# Patient Record
Sex: FEMALE | Race: WHITE | Employment: OTHER | URBAN - METROPOLITAN AREA
[De-identification: names, ages, dates, MRNs, and addresses within clinical notes are randomized per-mention and may not be internally consistent; named-entity substitution may affect disease eponyms.]

---

## 2017-01-27 ENCOUNTER — LAB ONLY (OUTPATIENT)
Dept: INTERNAL MEDICINE CLINIC | Age: 72
End: 2017-01-27

## 2017-01-27 ENCOUNTER — HOSPITAL ENCOUNTER (OUTPATIENT)
Dept: LAB | Age: 72
Discharge: HOME | End: 2017-01-27
Payer: COMMERCIAL

## 2017-01-27 DIAGNOSIS — R82.90 ABNORMAL URINE ODOR: ICD-10-CM

## 2017-01-27 DIAGNOSIS — R82.90 ABNORMAL URINE ODOR: Primary | ICD-10-CM

## 2017-01-27 LAB
APPEARANCE UR: ABNORMAL
BACTERIA URNS QL MICRO: ABNORMAL /HPF
BILIRUB UR QL: NEGATIVE
CHOLEST SERPL-MCNC: 323 MG/DL
COLOR UR: YELLOW
EPITH CASTS URNS QL MICRO: ABNORMAL /LPF (ref 0–5)
GLUCOSE UR STRIP.AUTO-MCNC: NEGATIVE MG/DL
HDLC SERPL-MCNC: 53 MG/DL (ref 40–60)
HDLC SERPL: 6.1 {RATIO} (ref 0–5)
HGB UR QL STRIP: NEGATIVE
KETONES UR QL STRIP.AUTO: NEGATIVE MG/DL
LDLC SERPL CALC-MCNC: 209.8 MG/DL (ref 0–100)
LEUKOCYTE ESTERASE UR QL STRIP.AUTO: ABNORMAL
LIPID PROFILE,FLP: ABNORMAL
NITRITE UR QL STRIP.AUTO: NEGATIVE
PH UR STRIP: 6.5 [PH] (ref 5–8)
PROT UR STRIP-MCNC: NEGATIVE MG/DL
RBC #/AREA URNS HPF: ABNORMAL /HPF (ref 0–5)
SP GR UR REFRACTOMETRY: 1.02 (ref 1–1.03)
TRIGL SERPL-MCNC: 301 MG/DL (ref ?–150)
UROBILINOGEN UR QL STRIP.AUTO: 0.2 EU/DL (ref 0.2–1)
VLDLC SERPL CALC-MCNC: 60.2 MG/DL
WBC URNS QL MICRO: ABNORMAL /HPF (ref 0–4)

## 2017-01-27 PROCEDURE — 80061 LIPID PANEL: CPT | Performed by: INTERNAL MEDICINE

## 2017-01-27 PROCEDURE — 82306 VITAMIN D 25 HYDROXY: CPT | Performed by: INTERNAL MEDICINE

## 2017-01-27 PROCEDURE — 81001 URINALYSIS AUTO W/SCOPE: CPT | Performed by: INTERNAL MEDICINE

## 2017-01-27 PROCEDURE — 36415 COLL VENOUS BLD VENIPUNCTURE: CPT | Performed by: INTERNAL MEDICINE

## 2017-01-28 LAB — 25(OH)D3 SERPL-MCNC: 16.4 NG/ML (ref 30–100)

## 2017-02-03 ENCOUNTER — OFFICE VISIT (OUTPATIENT)
Dept: INTERNAL MEDICINE CLINIC | Age: 72
End: 2017-02-03

## 2017-02-03 VITALS
TEMPERATURE: 97.5 F | WEIGHT: 209 LBS | OXYGEN SATURATION: 95 % | BODY MASS INDEX: 34.82 KG/M2 | HEART RATE: 94 BPM | SYSTOLIC BLOOD PRESSURE: 122 MMHG | DIASTOLIC BLOOD PRESSURE: 78 MMHG | RESPIRATION RATE: 16 BRPM | HEIGHT: 65 IN

## 2017-02-03 DIAGNOSIS — Z23 ENCOUNTER FOR IMMUNIZATION: ICD-10-CM

## 2017-02-03 DIAGNOSIS — F41.9 ANXIETY: ICD-10-CM

## 2017-02-03 DIAGNOSIS — E03.4 HYPOTHYROIDISM DUE TO ACQUIRED ATROPHY OF THYROID: ICD-10-CM

## 2017-02-03 DIAGNOSIS — E55.9 HYPOVITAMINOSIS D: ICD-10-CM

## 2017-02-03 DIAGNOSIS — I10 ESSENTIAL HYPERTENSION: Primary | ICD-10-CM

## 2017-02-03 NOTE — MR AVS SNAPSHOT
Visit Information Date & Time Provider Department Dept. Phone Encounter #  
 2/3/2017 11:00 AM Reji Johnson MD Internist of [facility] [id number] Your Appointments 6/1/2017  9:55 AM  
LAB with [facility] NURSE VISIT Internist of [facility] Appt Note: labs for rpe rm  
 [address] [facility] - [place] [id number] [facility] [address]  
  
   
 [address] [facility]  
  
    
 6/8/2017  9:15 AM  
PHYSICAL with Reji Johnson MD  
Internist of [facility]) Appt Note: rpe rm  
 [address] [facility] - [address]  
  
   
 [address] [facility] Upcoming Health Maintenance Date Due DTaP/Tdap/Td series (1 - Tdap) 10/4/1968 MEDICARE YEARLY EXAM 10/4/2012 Pneumococcal 65+ Low/Medium Risk (2 of 2 - PCV13) 8/28/2014 INFLUENZA AGE 9 TO ADULT 8/1/2016 BREAST CANCER SCRN MAMMOGRAM 12/11/2016 GLAUCOMA SCREENING Q2Y 4/20/2017 COLONOSCOPY 1/20/2026 Allergies as of 2/3/2017  Review Complete On: 2/3/2017 By: Reji Johnson MD  
  
 Severity Noted Reaction Type Reactions Codeine  01/27/2012    Other (comments) Drops her blood pressure Iodine  02/28/2012    Hives Percodan [Oxycodone Hcl-oxycodone-asa]  01/27/2012    Other (comments) Feels strange Sulfa (Sulfonamide Antibiotics)  01/27/2012    Rash Vicodin [Hydrocodone-acetaminophen]  01/27/2012    Other (comments) Feels strange Current Immunizations  Reviewed on 1/20/2016 Name Date Influenza High Dose Vaccine PF 2/3/2017 12:03 PM  
 Influenza Vaccine (Quad) PF 1/20/2016  2:39 PM  
 Influenza Vaccine PF 10/21/2014, 10/28/2013 Pneumococcal Polysaccharide (PPSV-23) 8/28/2013 10:17 AM  
 Zoster Vaccine, Live 10/28/2013 Not reviewed this visit You Were Diagnosed With   
  
 Codes Comments Encounter for immunization    -  Primary ICD-10-CM: T41 ICD-9-CM: V03.89 Vitals BP Pulse Temp Resp Height(growth percentile) Weight(growth percentile) 122/78 (BP 1 Location: Left arm, BP Patient Position: Sitting) 94 97.5 °F (36.4 °C) (Oral) 16 5' 5\" (1.651 m) 209 lb (94.8 kg) SpO2 BMI OB Status Smoking Status 95% 34.78 kg/m2 Postmenopausal Never Smoker Vitals History BMI and BSA Data Body Mass Index Body Surface Area 34.78 kg/m 2 2.09 m 2 Preferred Pharmacy Pharmacy Name Phone [address] [facility], [address] [place] [address] [phone] Your Updated Medication List  
  
   
This list is accurate as of: 2/3/17 12:16 PM.  Always use your most recent med list.  
  
  
  
  
 levothyroxine 112 mcg tablet Commonly known as:  SYNTHROID  
TAKE 1 TABLET BY MOUTH EVERY MORNING BEFORE BREAKFAST  
  
 triamterene-hydroCHLOROthiazide 37.5-25 mg per tablet Commonly known as:  Anaya Perch TAKE 1 TABLET BY MOUTH EVERY DAY We Performed the Following INFLUENZA VIRUS VACCINE, HIGH DOSE SEASONAL, PRESERVATIVE FREE [57386 CPT(R)] Introducing [facility] & HEALTH SERVICES! Dear Dima Duong: 
Thank you for requesting a [employer] account. Our records indicate that you already have an active InboxFever account. You can access your account anytime at [email or web address] Did you know that you can access your hospital and ER discharge instructions at any time in Inbox[employer]? You can also review all of your test results from your hospital stay or ER visit. Additional Information If you have questions, please visit the Frequently Asked Questions section of the InboxFever website at [email or web address] Remember, InboxFever is NOT to be used for urgent needs. For medical emergencies, dial 911. Now available from your iPhone and Android! Please provide this summary of care documentation to your next provider. Your primary care clinician is listed as Isaac Cazares. Safia Ruffin. If you have any questions after today's visit, please call [phone].

## 2017-02-03 NOTE — PROGRESS NOTES
Dmitry Berg 1945, is a 73 y.o. female, who is seen today for reevaluation of hyperlipidemia hypovitaminosis D thyroidism overweight. She had been doing reasonably well but anxious a lot of the time. She is now been called for jury duty and with her anxiety she sees no way she could participate with jury duty. She takes her medicine regularly except not taking vitamin D regularly.     Past Medical History   Diagnosis Date    Allergic rhinitis     GERD (gastroesophageal reflux disease)     Hypercholesterolemia     Thyroid disease      Past Surgical History   Procedure Laterality Date    Xr endo egd w dilatation  2/2/12     intrinsic stricture, esophageal junction    Hx heent       tonsillectomy     Current Outpatient Prescriptions   Medication Sig Dispense Refill    levothyroxine (SYNTHROID) 112 mcg tablet TAKE 1 TABLET BY MOUTH EVERY MORNING BEFORE BREAKFAST 90 Tab 0    triamterene-hydrochlorothiazide (MAXZIDE) 37.5-25 mg per tablet TAKE 1 TABLET BY MOUTH EVERY DAY 90 Tab 0     Allergies   Allergen Reactions    Codeine Other (comments)     Drops her blood pressure    Iodine Hives    Percodan [Oxycodone Hcl-Oxycodone-Asa] Other (comments)     Feels strange    Sulfa (Sulfonamide Antibiotics) Rash    Vicodin [Hydrocodone-Acetaminophen] Other (comments)     Feels strange       Social History     Social History    Marital status:      Spouse name: N/A    Number of children: N/A    Years of education: N/A     Social History Main Topics    Smoking status: Never Smoker    Smokeless tobacco: Never Used    Alcohol use No    Drug use: No    Sexual activity: Not Asked     Other Topics Concern    None     Social History Narrative     Visit Vitals    /78 (BP 1 Location: Left arm, BP Patient Position: Sitting)    Pulse 94    Temp 97.5 °F (36.4 °C) (Oral)    Resp 16    Ht 5' 5\" (1.651 m)    Wt 209 lb (94.8 kg)    SpO2 95%    BMI 34.78 kg/m2     The patient is a well-developed well-nourished female in no apparent distress. HEENT: Pupils are equal and react to light and extraocular movements are full. Ear canals and tympanic membranes appear normal. Oral cavity appears normal with no oral lesions. Neck: Carotids are 2+ without bruits. No adenopathy or thyromegaly. Lungs are clear to percussion. I hear no wheezing, rales or rhonchi. Heart reveals a regular rhythm with no murmur, gallop, click or rub. The apical impulse is in the fifth interspace at the midclavicular line. Abdomen is soft and nontender with no hepatosplenomegaly or masses. Bowel sounds are normoactive and there is no distention or tympany. Extremities reveal no clubbing cyanosis or edema. Pulses are 2+. Skin reveals no suspicious skin growths. Breasts reveal no masses, skin or nipple abnormalities. No axillary adenopathy. Results for orders placed or performed during the hospital encounter of 01/27/17   URINALYSIS W/ RFLX MICROSCOPIC   Result Value Ref Range    Color YELLOW      Appearance CLOUDY      Specific gravity 1.019 1.005 - 1.030      pH (UA) 6.5 5.0 - 8.0      Protein NEGATIVE  NEG mg/dL    Glucose NEGATIVE  NEG mg/dL    Ketone NEGATIVE  NEG mg/dL    Bilirubin NEGATIVE  NEG      Blood NEGATIVE  NEG      Urobilinogen 0.2 0.2 - 1.0 EU/dL    Nitrites NEGATIVE  NEG      Leukocyte Esterase LARGE (A) NEG     LIPID PANEL   Result Value Ref Range    LIPID PROFILE          Cholesterol, total 323 (H) <200 MG/DL    Triglyceride 301 (H) <150 MG/DL    HDL Cholesterol 53 40 - 60 MG/DL    LDL, calculated 209.8 (H) 0 - 100 MG/DL    VLDL, calculated 60.2 MG/DL    CHOL/HDL Ratio 6.1 (H) 0 - 5.0     VITAMIN D, 25 HYDROXY   Result Value Ref Range    Vitamin D 25-Hydroxy 16.4 (L) 30 - 100 ng/mL   URINE MICROSCOPIC ONLY   Result Value Ref Range    WBC 21 to 35 0 - 4 /hpf    RBC 0 to 3 0 - 5 /hpf    Epithelial cells 3+ 0 - 5 /lpf    Bacteria 3+ (A) NEG /hpf     Assessment: #1.   Patient remains quite anxious and is not a very good candidate for jury duty at this time. #2. Hypovitaminosis D, she does need to increase her vitamin D to at least 2000 units daily. #3. Hypothyroidism doing well, she will continue levothyroxine 112 µg daily. #4.  Hypertension doing well, she will continue Maxide 25 1 daily. Follow-up in 3 months for physical    Demetris Dwyer MD FACP    Please note: This document has been produced using voice recognition software. Unrecognized errors in transcription may be present.

## 2017-02-03 NOTE — LETTER
2/3/2017 6:31 PM 
 
Ms. Divine Baum Jarocho Dhillonlc[phone] [facility] [address] To Whom It May Concern: 
 
Divine Baum has been a long-term patient of mine at [facility]. She suffers from significant anxiety and at this point would be a poor candidate for jury duty. A final decision is up to the court. Sincerely, Estefani Hyde MD, FACP

## 2017-02-03 NOTE — PROGRESS NOTES
Patient is in the office today for a  follow up. Do you have an Advance Directive no  Do you want more information, will give information. 1. Have you been to the ER, urgent care clinic since your last visit? Hospitalized since your last visit? No    2. Have you seen or consulted any other health care providers outside of the [address] [place] [address] since your last visit? Include any pap smears or colon screening.  No

## 2017-02-04 ENCOUNTER — PATIENT MESSAGE (OUTPATIENT)
Dept: INTERNAL MEDICINE CLINIC | Age: 72
End: 2017-02-04

## 2017-02-27 RX ORDER — TRIAMTERENE/HYDROCHLOROTHIAZID 37.5-25 MG
TABLET ORAL
Qty: 90 TAB | Refills: 0 | Status: SHIPPED | OUTPATIENT
Start: 2017-02-27 | End: 2017-03-02 | Stop reason: SDUPTHER

## 2017-03-02 RX ORDER — TRIAMTERENE/HYDROCHLOROTHIAZID 37.5-25 MG
TABLET ORAL
Qty: 90 TAB | Refills: 0 | Status: SHIPPED | OUTPATIENT
Start: 2017-03-02 | End: 2017-05-26 | Stop reason: SDUPTHER

## 2017-03-16 RX ORDER — LEVOTHYROXINE SODIUM 112 UG/1
TABLET ORAL
Qty: 90 TAB | Refills: 0 | Status: SHIPPED | OUTPATIENT
Start: 2017-03-16

## 2017-05-30 RX ORDER — TRIAMTERENE/HYDROCHLOROTHIAZID 37.5-25 MG
TABLET ORAL
Qty: 90 TAB | Refills: 0 | Status: SHIPPED | OUTPATIENT
Start: 2017-05-30 | End: 2017-08-24 | Stop reason: SDUPTHER

## 2017-06-01 ENCOUNTER — HOSPITAL ENCOUNTER (OUTPATIENT)
Dept: LAB | Age: 72
Discharge: HOME | End: 2017-06-01
Payer: COMMERCIAL

## 2017-06-01 DIAGNOSIS — E55.9 HYPOVITAMINOSIS D: ICD-10-CM

## 2017-06-01 DIAGNOSIS — E03.4 HYPOTHYROIDISM DUE TO ACQUIRED ATROPHY OF THYROID: ICD-10-CM

## 2017-06-01 DIAGNOSIS — I10 ESSENTIAL HYPERTENSION: ICD-10-CM

## 2017-06-01 LAB
ALBUMIN SERPL BCP-MCNC: 3.7 G/DL (ref 3.4–5)
ALBUMIN/GLOB SERPL: 1.1 {RATIO} (ref 0.8–1.7)
ALP SERPL-CCNC: 71 U/L (ref 45–117)
ALT SERPL-CCNC: 26 U/L (ref 13–56)
ANION GAP BLD CALC-SCNC: 12 MMOL/L (ref 3–18)
APPEARANCE UR: CLEAR
AST SERPL W P-5'-P-CCNC: 22 U/L (ref 15–37)
BACTERIA URNS QL MICRO: ABNORMAL /HPF
BASOPHILS # BLD AUTO: 0 K/UL (ref 0–0.06)
BASOPHILS # BLD: 1 % (ref 0–2)
BILIRUB SERPL-MCNC: 0.7 MG/DL (ref 0.2–1)
BILIRUB UR QL: NEGATIVE
BUN SERPL-MCNC: 17 MG/DL (ref 7–18)
BUN/CREAT SERPL: 15 (ref 12–20)
CALCIUM SERPL-MCNC: 9.8 MG/DL (ref 8.5–10.1)
CHLORIDE SERPL-SCNC: 100 MMOL/L (ref 100–108)
CHOLEST SERPL-MCNC: 309 MG/DL
CO2 SERPL-SCNC: 28 MMOL/L (ref 21–32)
COLOR UR: YELLOW
CREAT SERPL-MCNC: 1.14 MG/DL (ref 0.6–1.3)
DIFFERENTIAL METHOD BLD: NORMAL
EOSINOPHIL # BLD: 0.2 K/UL (ref 0–0.4)
EOSINOPHIL NFR BLD: 3 % (ref 0–5)
EPITH CASTS URNS QL MICRO: ABNORMAL /LPF (ref 0–5)
ERYTHROCYTE [DISTWIDTH] IN BLOOD BY AUTOMATED COUNT: 14.5 % (ref 11.6–14.5)
GLOBULIN SER CALC-MCNC: 3.5 G/DL (ref 2–4)
GLUCOSE SERPL-MCNC: 95 MG/DL (ref 74–99)
GLUCOSE UR STRIP.AUTO-MCNC: NEGATIVE MG/DL
HCT VFR BLD AUTO: 44.4 % (ref 35–45)
HDLC SERPL-MCNC: 51 MG/DL (ref 40–60)
HDLC SERPL: 6.1 {RATIO} (ref 0–5)
HGB BLD-MCNC: 14.6 G/DL (ref 12–16)
HGB UR QL STRIP: NEGATIVE
KETONES UR QL STRIP.AUTO: NEGATIVE MG/DL
LDLC SERPL CALC-MCNC: 203.2 MG/DL (ref 0–100)
LEUKOCYTE ESTERASE UR QL STRIP.AUTO: ABNORMAL
LIPID PROFILE,FLP: ABNORMAL
LYMPHOCYTES # BLD AUTO: 26 % (ref 21–52)
LYMPHOCYTES # BLD: 1.8 K/UL (ref 0.9–3.6)
MCH RBC QN AUTO: 29.6 PG (ref 24–34)
MCHC RBC AUTO-ENTMCNC: 32.9 G/DL (ref 31–37)
MCV RBC AUTO: 89.9 FL (ref 74–97)
MONOCYTES # BLD: 0.3 K/UL (ref 0.05–1.2)
MONOCYTES NFR BLD AUTO: 4 % (ref 3–10)
NEUTS SEG # BLD: 4.6 K/UL (ref 1.8–8)
NEUTS SEG NFR BLD AUTO: 66 % (ref 40–73)
NITRITE UR QL STRIP.AUTO: NEGATIVE
PH UR STRIP: 7.5 [PH] (ref 5–8)
PLATELET # BLD AUTO: 291 K/UL (ref 135–420)
PMV BLD AUTO: 11.2 FL (ref 9.2–11.8)
POTASSIUM SERPL-SCNC: 3.5 MMOL/L (ref 3.5–5.5)
PROT SERPL-MCNC: 7.2 G/DL (ref 6.4–8.2)
PROT UR STRIP-MCNC: NEGATIVE MG/DL
RBC # BLD AUTO: 4.94 M/UL (ref 4.2–5.3)
RBC #/AREA URNS HPF: 0 /HPF (ref 0–5)
SODIUM SERPL-SCNC: 140 MMOL/L (ref 136–145)
SP GR UR REFRACTOMETRY: 1.01 (ref 1–1.03)
TRIGL SERPL-MCNC: 274 MG/DL (ref ?–150)
TSH SERPL DL<=0.05 MIU/L-ACNC: 2.11 UIU/ML (ref 0.36–3.74)
UROBILINOGEN UR QL STRIP.AUTO: 1 EU/DL (ref 0.2–1)
VLDLC SERPL CALC-MCNC: 54.8 MG/DL
WBC # BLD AUTO: 6.9 K/UL (ref 4.6–13.2)
WBC URNS QL MICRO: ABNORMAL /HPF (ref 0–4)

## 2017-06-01 PROCEDURE — 36415 COLL VENOUS BLD VENIPUNCTURE: CPT | Performed by: INTERNAL MEDICINE

## 2017-06-01 PROCEDURE — 82306 VITAMIN D 25 HYDROXY: CPT | Performed by: INTERNAL MEDICINE

## 2017-06-01 PROCEDURE — 80053 COMPREHEN METABOLIC PANEL: CPT | Performed by: INTERNAL MEDICINE

## 2017-06-01 PROCEDURE — 85025 COMPLETE CBC W/AUTO DIFF WBC: CPT | Performed by: INTERNAL MEDICINE

## 2017-06-01 PROCEDURE — 81001 URINALYSIS AUTO W/SCOPE: CPT | Performed by: INTERNAL MEDICINE

## 2017-06-01 PROCEDURE — 80061 LIPID PANEL: CPT | Performed by: INTERNAL MEDICINE

## 2017-06-01 PROCEDURE — 84443 ASSAY THYROID STIM HORMONE: CPT | Performed by: INTERNAL MEDICINE

## 2017-06-02 LAB — 25(OH)D3 SERPL-MCNC: 20.9 NG/ML (ref 30–100)

## 2017-06-08 ENCOUNTER — OFFICE VISIT (OUTPATIENT)
Dept: INTERNAL MEDICINE CLINIC | Age: 72
End: 2017-06-08

## 2017-06-08 ENCOUNTER — HOSPITAL ENCOUNTER (OUTPATIENT)
Dept: LAB | Age: 72
Discharge: HOME | End: 2017-06-08
Payer: COMMERCIAL

## 2017-06-08 VITALS
WEIGHT: 210 LBS | HEART RATE: 89 BPM | SYSTOLIC BLOOD PRESSURE: 114 MMHG | BODY MASS INDEX: 34.99 KG/M2 | DIASTOLIC BLOOD PRESSURE: 78 MMHG | HEIGHT: 65 IN | OXYGEN SATURATION: 96 % | RESPIRATION RATE: 14 BRPM | TEMPERATURE: 97.6 F

## 2017-06-08 DIAGNOSIS — E03.4 HYPOTHYROIDISM DUE TO ACQUIRED ATROPHY OF THYROID: ICD-10-CM

## 2017-06-08 DIAGNOSIS — Z00.00 ROUTINE GENERAL MEDICAL EXAMINATION AT A HEALTH CARE FACILITY: Primary | ICD-10-CM

## 2017-06-08 DIAGNOSIS — R30.0 DYSURIA: ICD-10-CM

## 2017-06-08 DIAGNOSIS — Z12.31 SCREENING MAMMOGRAM, ENCOUNTER FOR: ICD-10-CM

## 2017-06-08 DIAGNOSIS — Z13.820 OSTEOPOROSIS SCREENING: ICD-10-CM

## 2017-06-08 DIAGNOSIS — E55.9 HYPOVITAMINOSIS D: ICD-10-CM

## 2017-06-08 DIAGNOSIS — E78.5 HYPERLIPIDEMIA LDL GOAL <160: ICD-10-CM

## 2017-06-08 DIAGNOSIS — Z23 ENCOUNTER FOR IMMUNIZATION: ICD-10-CM

## 2017-06-08 DIAGNOSIS — I10 ESSENTIAL HYPERTENSION: ICD-10-CM

## 2017-06-08 PROCEDURE — 87086 URINE CULTURE/COLONY COUNT: CPT | Performed by: INTERNAL MEDICINE

## 2017-06-08 RX ORDER — ATORVASTATIN CALCIUM 40 MG/1
TABLET, FILM COATED ORAL
Qty: 90 TAB | Refills: 3 | Status: ON HOLD | OUTPATIENT
Start: 2017-06-08 | End: 2017-11-08

## 2017-06-08 RX ORDER — CHOLECALCIFEROL (VITAMIN D3) 125 MCG
CAPSULE ORAL
Qty: 100 TAB
Start: 2017-06-08

## 2017-06-08 NOTE — PROGRESS NOTES
Lidia Severino 1945, is a 73 y.o. female, who is seen today for a routine physical exam and follow-up on hyperlipidemia obesity hypertension hypothyroidism. Many years ago she was on statin therapy for marked hyperlipidemia but it was quite expensive at that time, apparently she was on Zocor. After coming off the medicine her cholesterol did go up considerably and she continues to try to follow a low-fat low-cholesterol diet with cholesterol has remained quite high. She had no side effects with simvastatin. She has hypertension which has been well controlled and she uses Maxide 25 regularly. She is obese and says she gained 50 pounds in 1991 when she was diagnosed with hypothyroidism and despite treatment has not been able to lose any weight since then. Weight is currently unchanged from last year and not significantly changed for many years. She has noticed for at least several months a slight pressure in the suprapubic area relieved briefly when she urinates and then it recurs. She has had no dysuria but does have a strong odor to her urine last few months. She has nocturia ×1 and urinary frequency in the daytime but does drink a lot of fluids.     Past Medical History:   Diagnosis Date    Allergic rhinitis     GERD (gastroesophageal reflux disease)     Hypercholesterolemia     Thyroid disease      Past Surgical History:   Procedure Laterality Date    HX HEENT      tonsillectomy    XR ENDO EGD W DILATATION  2/2/12    intrinsic stricture, esophageal junction     Current Outpatient Prescriptions   Medication Sig Dispense Refill    triamterene-hydroCHLOROthiazide (MAXZIDE) 37.5-25 mg per tablet TAKE 1 TABLET BY MOUTH EVERY DAY 90 Tab 0    levothyroxine (SYNTHROID) 112 mcg tablet TAKE 1 TABLET BY MOUTH EVERY DAY BEFORE BREAKFAST 90 Tab 0     Allergies   Allergen Reactions    Codeine Other (comments)     Drops her blood pressure    Iodine Hives    Percodan [Oxycodone Hcl-Oxycodone-Asa] Other (comments)     Feels strange    Sulfa (Sulfonamide Antibiotics) Rash    Vicodin [Hydrocodone-Acetaminophen] Other (comments)     Feels strange       Social History     Social History    Marital status:      Spouse name: N/A    Number of children: N/A    Years of education: N/A     Social History Main Topics    Smoking status: Never Smoker    Smokeless tobacco: Never Used    Alcohol use No    Drug use: No    Sexual activity: Not Asked     Other Topics Concern    None     Social History Narrative     Visit Vitals    /78    Pulse 89    Temp 97.6 °F (36.4 °C) (Oral)    Resp 14    Ht 5' 5\" (1.651 m)    Wt 210 lb (95.3 kg)    SpO2 96%    BMI 34.95 kg/m2     The patient is a well-developed well-nourished female in no apparent distress. HEENT: Pupils are equal and react to light and extraocular movements are full. Ear canal on the right is occluded with cerumen. On the left there is almost no wax. Oral cavity appears normal with no oral lesions. Neck: Carotids are 2+ without bruits. No adenopathy or thyromegaly. Lungs are clear to percussion. I hear no wheezing, rales or rhonchi. Heart reveals a regular rhythm with no murmur, gallop, click or rub. The apical impulse is in the fifth interspace at the midclavicular line. Abdomen is soft and nontender with no hepatosplenomegaly or masses. Bowel sounds are normoactive and there is no distention or tympany. Extremities reveal no clubbing cyanosis or edema. Pulses are 2+. Skin reveals no suspicious skin growths. Breasts reveal no masses, skin or nipple abnormalities. No axillary adenopathy.     Results for orders placed or performed during the hospital encounter of 06/01/17   CBC WITH AUTOMATED DIFF   Result Value Ref Range    WBC 6.9 4.6 - 13.2 K/uL    RBC 4.94 4.20 - 5.30 M/uL    HGB 14.6 12.0 - 16.0 g/dL    HCT 44.4 35.0 - 45.0 %    MCV 89.9 74.0 - 97.0 FL    MCH 29.6 24.0 - 34.0 PG    MCHC 32.9 31.0 - 37.0 g/dL    RDW 14.5 11.6 - 14.5 % PLATELET 014 473 - 368 K/uL    MPV 11.2 9.2 - 11.8 FL    NEUTROPHILS 66 40 - 73 %    LYMPHOCYTES 26 21 - 52 %    MONOCYTES 4 3 - 10 %    EOSINOPHILS 3 0 - 5 %    BASOPHILS 1 0 - 2 %    ABS. NEUTROPHILS 4.6 1.8 - 8.0 K/UL    ABS. LYMPHOCYTES 1.8 0.9 - 3.6 K/UL    ABS. MONOCYTES 0.3 0.05 - 1.2 K/UL    ABS. EOSINOPHILS 0.2 0.0 - 0.4 K/UL    ABS. BASOPHILS 0.0 0.0 - 0.06 K/UL    DF AUTOMATED     LIPID PANEL   Result Value Ref Range    LIPID PROFILE          Cholesterol, total 309 (H) <200 MG/DL    Triglyceride 274 (H) <150 MG/DL    HDL Cholesterol 51 40 - 60 MG/DL    LDL, calculated 203.2 (H) 0 - 100 MG/DL    VLDL, calculated 54.8 MG/DL    CHOL/HDL Ratio 6.1 (H) 0 - 5.0     METABOLIC PANEL, COMPREHENSIVE   Result Value Ref Range    Sodium 140 136 - 145 mmol/L    Potassium 3.5 3.5 - 5.5 mmol/L    Chloride 100 100 - 108 mmol/L    CO2 28 21 - 32 mmol/L    Anion gap 12 3.0 - 18 mmol/L    Glucose 95 74 - 99 mg/dL    BUN 17 7.0 - 18 MG/DL    Creatinine 1.14 0.6 - 1.3 MG/DL    BUN/Creatinine ratio 15 12 - 20      GFR est AA 57 (L) >60 ml/min/1.73m2    GFR est non-AA 47 (L) >60 ml/min/1.73m2    Calcium 9.8 8.5 - 10.1 MG/DL    Bilirubin, total 0.7 0.2 - 1.0 MG/DL    ALT (SGPT) 26 13 - 56 U/L    AST (SGOT) 22 15 - 37 U/L    Alk.  phosphatase 71 45 - 117 U/L    Protein, total 7.2 6.4 - 8.2 g/dL    Albumin 3.7 3.4 - 5.0 g/dL    Globulin 3.5 2.0 - 4.0 g/dL    A-G Ratio 1.1 0.8 - 1.7     TSH 3RD GENERATION   Result Value Ref Range    TSH 2.11 0.36 - 3.74 uIU/mL   URINALYSIS W/ RFLX MICROSCOPIC   Result Value Ref Range    Color YELLOW      Appearance CLEAR      Specific gravity 1.011 1.005 - 1.030      pH (UA) 7.5 5.0 - 8.0      Protein NEGATIVE  NEG mg/dL    Glucose NEGATIVE  NEG mg/dL    Ketone NEGATIVE  NEG mg/dL    Bilirubin NEGATIVE  NEG      Blood NEGATIVE  NEG      Urobilinogen 1.0 0.2 - 1.0 EU/dL    Nitrites NEGATIVE  NEG      Leukocyte Esterase MODERATE (A) NEG     VITAMIN D, 25 HYDROXY   Result Value Ref Range    Vitamin D 25-Hydroxy 20.9 (L) 30 - 100 ng/mL   URINE MICROSCOPIC ONLY   Result Value Ref Range    WBC 8 to 10 0 - 4 /hpf    RBC 0 0 - 5 /hpf    Epithelial cells 1+ 0 - 5 /lpf    Bacteria 1+ (A) NEG /hpf     Assessment: #1. Hyperlipidemia with risk of developing coronary disease over the next 10 years approximately 10.7%. She will continue working to try to keep her weight down with fairly low-fat diet and cut back on starches. She also agrees to start atorvastatin. She will be started at 40 mg each evening and if she develops significant side effects including significant myalgia medication will be stopped and she will call me. She understands the goal of therapy and the expectations of therapy statistically. #2. Hypertension controlled. She will continue Maxide 25 1 daily. #3. Hypothyroidism with normal TSH, she will continue levothyroxine 112 mcg daily. #4.  Vague pressure in the suprapubic area. We will check urine culture today and if this is negative she would like to see her gynecologist in the [facility] area. She will plan to see her daughter's gynecologist in the case. #5.  Obesity with body mass index of 35, unchanged over the last year and not significantly changed since 1991, I have explained that it is not related to her level of thyroid at this point. She will do the best she can to reduce weight. #6. Hypovitaminosis D, she will start vitamin D 2000 units daily and we will check bone density and mammography in the near future. She will receive PCV 13 and tetanus booster today. Follow-up in 6 months with [facility] JEANINE Ceja MD FACP    Please note: This document has been produced using voice recognition software. Unrecognized errors in transcription may be present.

## 2017-06-08 NOTE — MR AVS SNAPSHOT
Visit Information Date & Time Provider Department Dept. Phone Encounter #  
 6/8/2017  9:15 AM Hien Parr MD Internist of [address] [id number] Follow-up Instructions Return in about 6 months (around 12/8/2017). Your Appointments 12/13/2017 10:00 AM  
Office Visit with Hien Parr MD  
Internist of [address] [facility] [address] [facility] [address] [facility] [address] [facility]) Appt Note: 6 month f/u  
 [address] [facility], [address] [address] [address]  
  
   
 [address] [facility], [address] [facility] Upcoming Health Maintenance Date Due DTaP/Tdap/Td series (1 - Tdap) 10/4/1966 MEDICARE YEARLY EXAM 10/4/2012 BREAST CANCER SCRN MAMMOGRAM 6/29/2017* GLAUCOMA SCREENING Q2Y 7/27/2017* INFLUENZA AGE 9 TO ADULT 8/1/2017 COLONOSCOPY 1/20/2026 *Topic was postponed. The date shown is not the original due date. Allergies as of 6/8/2017  Review Complete On: 6/8/2017 By: Hien Parr MD  
  
 Severity Noted Reaction Type Reactions Codeine  01/27/2012    Other (comments) Drops her blood pressure Iodine  02/28/2012    Hives Percodan [Oxycodone Hcl-oxycodone-asa]  01/27/2012    Other (comments) Feels strange Sulfa (Sulfonamide Antibiotics)  01/27/2012    Rash Vicodin [Hydrocodone-acetaminophen]  01/27/2012    Other (comments) Feels strange Current Immunizations  Reviewed on 1/20/2016 Name Date Influenza High Dose Vaccine PF 2/3/2017 12:03 PM  
 Influenza Vaccine (Quad) PF 1/20/2016  2:39 PM  
 Influenza Vaccine PF 10/21/2014, 10/28/2013 Pneumococcal Conjugate (PCV-13) 6/8/2017 10:11 AM  
 Pneumococcal Polysaccharide (PPSV-23) 8/28/2013 10:17 AM  
 Tdap 6/8/2017 10:12 AM  
 Zoster Vaccine, Live 10/28/2013 Not reviewed this visit You Were Diagnosed With   
  
 Codes Comments Routine general medical examination at a health care facility    -  Primary ICD-10-CM: Z00.00 ICD-9-CM: V70.0 Encounter for immunization     ICD-10-CM: V49 ICD-9-CM: V03.89 Hypothyroidism due to acquired atrophy of thyroid     ICD-10-CM: E03.4 ICD-9-CM: 244.8, 246.8 Essential hypertension     ICD-10-CM: I10 
ICD-9-CM: 401.9 Hyperlipidemia LDL goal <160     ICD-10-CM: E78.5 ICD-9-CM: 272.4 Screening mammogram, encounter for     ICD-10-CM: Z12.31 
ICD-9-CM: V76.12 Osteoporosis screening     ICD-10-CM: Z13.820 ICD-9-CM: V82.81 Dysuria     ICD-10-CM: R30.0 ICD-9-CM: 455. 1 Vitals BP Pulse Temp Resp Height(growth percentile) Weight(growth percentile) 114/78 89 97.6 °F (36.4 °C) (Oral) 14 5' 5\" (1.651 m) 210 lb (95.3 kg) SpO2 BMI OB Status Smoking Status 96% 34.95 kg/m2 Postmenopausal Never Smoker Vitals History BMI and BSA Data Body Mass Index Body Surface Area 34.95 kg/m 2 2.09 m 2 Preferred Pharmacy Pharmacy Name Phone [facility], [address] [place] [address] [phone] Your Updated Medication List  
  
   
This list is accurate as of: 6/8/17 10:19 AM.  Always use your most recent med list.  
  
  
  
  
 levothyroxine 112 mcg tablet Commonly known as:  SYNTHROID  
TAKE 1 TABLET BY MOUTH EVERY DAY BEFORE BREAKFAST  
  
 triamterene-hydroCHLOROthiazide 37.5-25 mg per tablet Commonly known as:  Olayinka Estonian TAKE 1 TABLET BY MOUTH EVERY DAY We Performed the Following PNEUMOCOCCAL CONJ VACCINE 13 VALENT IM [id number] CPT(R)] TETANUS, DIPHTHERIA TOXOIDS AND ACELLULAR PERTUSSIS VACCINE (TDAP), IN INDIVIDS. >=7, IM [id number] CPT(R)] Follow-up Instructions Return in about 6 months (around 12/8/2017). To-Do List   
 Around 06/08/2017 Microbiology:  CULTURE, URINE Around 06/08/2017 Imaging:  DEXA BONE DENSITY STUDY AXIAL   
  
 06/08/2017 Imaging:  RICARDO MAMMO BI SCREENING INCL CAD Referral Information Referral ID Referred By Referred To  
  
 [id number] Angelique Gli MILES Not Available Visits Status Start Date End Date 1 New Request 6/8/17 6/8/18 If your referral has a status of pending review or denied, additional information will be sent to support the outcome of this decision. Introducing [facility] & HEALTH SERVICES! Dear Nanci Kingsley: 
Thank you for requesting a [employer] account. Our records indicate that you already have an active [employer] account. You can access your account anytime at [email or web address] Did you know that you can access your hospital and ER discharge instructions at any time in [employer]? You can also review all of your test results from your hospital stay or ER visit. Additional Information If you have questions, please visit the Frequently Asked Questions section of the [employer] website at [email or web address] Remember, [employer] is NOT to be used for urgent needs. For medical emergencies, dial 911. Now available from your iPhone and Android! Please provide this summary of care documentation to your next provider. Your primary care clinician is listed as Sarmad Bhatti. If you have any questions after today's visit, please call [phone].

## 2017-06-09 ENCOUNTER — TELEPHONE (OUTPATIENT)
Dept: INTERNAL MEDICINE CLINIC | Age: 72
End: 2017-06-09

## 2017-06-09 NOTE — TELEPHONE ENCOUNTER
----- Message from Ynes Pagan MD sent at 6/9/2017 12:49 PM EDT -----  Please notify the patient that the urine culture is negative.

## 2017-06-09 NOTE — TELEPHONE ENCOUNTER
Spoke with patient, given result message that there was no growth. She said she had a negative preliminary result last time and then it grew strep. She wants to know what her results are on Monday after the full 48 hours. Says she is still having a lot of pressure. She wants a call on Monday with urine culture results.

## 2017-06-10 LAB
BACTERIA SPEC CULT: NORMAL
SERVICE CMNT-IMP: NORMAL

## 2017-06-14 RX ORDER — LEVOTHYROXINE SODIUM 112 UG/1
TABLET ORAL
Qty: 90 TAB | Refills: 0 | Status: ON HOLD | OUTPATIENT
Start: 2017-06-14 | End: 2017-11-08

## 2017-08-24 RX ORDER — TRIAMTERENE/HYDROCHLOROTHIAZID 37.5-25 MG
TABLET ORAL
Qty: 90 TAB | Refills: 0 | Status: SHIPPED | OUTPATIENT
Start: 2017-08-24 | End: 2017-12-15 | Stop reason: ALTCHOICE

## 2017-09-10 RX ORDER — LEVOTHYROXINE SODIUM 112 UG/1
TABLET ORAL
Qty: 90 TAB | Refills: 0 | Status: ON HOLD | OUTPATIENT
Start: 2017-09-10 | End: 2017-11-08

## 2017-11-07 PROBLEM — R07.9 CHEST PAIN: Status: ACTIVE | Noted: 2017-11-07

## 2017-11-07 PROBLEM — I21.4 NON-ST ELEVATION (NSTEMI) MYOCARDIAL INFARCTION (HCC): Status: ACTIVE | Noted: 2017-11-07

## 2017-11-09 ENCOUNTER — PATIENT OUTREACH (OUTPATIENT)
Dept: INTERNAL MEDICINE CLINIC | Age: 72
End: 2017-11-09

## 2017-11-09 NOTE — PROGRESS NOTES
Transition of care coordination/Hospitalization  Marcel Perea a 81 y.o. female was admitted to [facility] on 11/7/17 to 11/8/17 for NSTEMI. Patient discharged and admitted to [facility] for urgent CABG. Will monitor for discharge.

## 2017-11-27 ENCOUNTER — TELEPHONE (OUTPATIENT)
Dept: INTERNAL MEDICINE CLINIC | Age: 72
End: 2017-11-27

## 2017-11-27 NOTE — TELEPHONE ENCOUNTER
Ivet Soria at [facility] is calling. Patient had to have emergency heart surgery. She is being d/c from [facility] on Coumadin. She is asking if we can monitor her levels. I told her that is done by cardio but stated she has not been established with cardio yet. The surgeon will prescribe the medication but does not monitor. Please advise.

## 2017-11-28 ENCOUNTER — PATIENT OUTREACH (OUTPATIENT)
Dept: INTERNAL MEDICINE CLINIC | Age: 72
End: 2017-11-28

## 2017-11-28 RX ORDER — AMOXICILLIN 250 MG
CAPSULE ORAL
COMMUNITY
Start: 2017-11-16 | End: 2017-12-15 | Stop reason: ALTCHOICE

## 2017-11-28 RX ORDER — POTASSIUM CHLORIDE 20 MEQ/1
40 TABLET, EXTENDED RELEASE ORAL
COMMUNITY
Start: 2017-11-27 | End: 2017-12-15 | Stop reason: DRUGHIGH

## 2017-11-28 RX ORDER — LORAZEPAM 0.5 MG/1
0.5 TABLET ORAL
COMMUNITY
Start: 2017-11-21 | End: 2017-12-08 | Stop reason: SDUPTHER

## 2017-11-28 RX ORDER — METOPROLOL TARTRATE 25 MG/1
12.5 TABLET, FILM COATED ORAL
COMMUNITY
Start: 2017-11-27 | End: 2017-12-15 | Stop reason: DRUGHIGH

## 2017-11-28 RX ORDER — ACETAMINOPHEN 500 MG
1000 TABLET ORAL
COMMUNITY
Start: 2017-11-17

## 2017-11-28 RX ORDER — ATORVASTATIN CALCIUM 40 MG/1
40 TABLET, FILM COATED ORAL
COMMUNITY
Start: 2017-11-16

## 2017-11-28 RX ORDER — BUMETANIDE 0.5 MG/1
0.5 TABLET ORAL
COMMUNITY
Start: 2017-11-21

## 2017-11-28 RX ORDER — WARFARIN 2 MG/1
4 TABLET ORAL
COMMUNITY
Start: 2017-11-27 | End: 2017-12-18 | Stop reason: SDUPTHER

## 2017-11-28 NOTE — TELEPHONE ENCOUNTER
Atif Finn MD   [facility] Nurses 16 hours ago (5:44 PM)                 We can manage her warfarin dosing, she will need a pro time in about 2 days if not already ordered          She needs to establish with cardio very soon as we are short staffed and overwhelmed by the number of current coumadin patients.  Left message with Ashley Kerr to let her know we can monitor the patient until she gets in with cardiology

## 2017-11-28 NOTE — PROGRESS NOTES
Transition of care coordination/Hospitalization  Manuel villalta 81 y.o. female was admitted to [facility] on 11/7/17 to 11/8/17 for NSTEMI. Patient discharged and subsequently admitted to [facility] on 11/8/17 to 11/27 for urgent CABG X 2.     Brief Hospital course:  Admitted to [facility] for c/o angina. Labs indicated  rise in troponin. EKG indicative of NSTEMI. Cardiac cath performed on 11/8 which showed 95% stenosis of LM. Patient was then discharged from [facility] and subsequently admitted to [facility] for emergent CABG. Was hypotensive and hyperglycemic s/p surgery which was treated;resolved. Was found to require O2 for HERNANDEZ. Also received transfusion for post operative anemia. Pertinent labs/imaging:  Component      Latest Ref Rng & Units 11/27/2017   POTASSIUM      3.5 - 5.5 mmol/L 3.7   SODIUM      133 - 145 mmol/L 132 (L)   CHLORIDE      98 - 110 mmol/L 93 (L)   Glucose       70 - 99 mg/dL 107 (H)   CALCIUM      8.4 - 10.5 mg/dL 9.5   BUN      6 - 22 mg/dL 21   CREATININE      0.8 - 1.4 mg/dL 0.8   CO2      20 - 32 mmol/L 28   eGFR African American      >60.0 >60.0   eGFR Non African American      >60.0 >60.0   ANION GAP      mmol/L 11.0   PROTIME      9.0 - 13.0 sec 19.4 (H)   INR      0.89 - 1.29 2.00 (H)   Platelet      063 - 829 K/uL 426   aPTT      22 - 36 sec 50 (H)   MAGNESIUM      1.6 - 2.5 mg/dL 2.2     Inpatient Consults:  Cardiothoracic surgery  Cardiology  Hematology/oncology  Pulmonary/Critical Care      RRAT Score: Low Risk            9       Total Score        3 Has Seen PCP in Last 6 Months (Yes=3, No=0)    2 . Living with Significant Other. Assisted Living. LTAC. SNF. or   Rehab    4 Charlson Comorbidity Score (Age + Comorbid Conditions)        Criteria that do not apply:    Patient Length of Stay (>5 days = 3)    IP Visits Last 12 Months (1-3=4, 4=9, >4=11)    Pt.  Coverage (Medicare=5 , Medicaid, or Self-Pay=4)        Hospital utilization in past 12 months: 1  ED utilization in past 12 months: 0    Contacted patient for transition of care s/p hospitalization. Introduced self, role and reason for call. Verified 2 patient identifiers. Patient reports:  2/10 sternal pain; taking Tylenol  Chest wound open to air  Numbness to toes bilaterally  Generalized weakness  Tiredness  Voiding   No BM yet  Swelling to bilateral feet/ankles  Dressing to right groin d/c/i    Patient denies:  Drainage, redness, warmth, swelling to sternal wound  Fever/chills  N/V  Dysuria     DME:   Home oxygen , RW, walk-in shower (awaiting concentrator)     Resources/Support:   , [facility]    AMD:   Not of file     Confirmed delivery of oxygen and RW. Patient verbalizes she was received instructions on use of O2. Patient reports requiring assistance with moving from a lying to sitting position and sitting to standing. Patient verbalizes she can walk fine. Advised patient to increase walking daily with use of RW. Patient verbalizes she has 2 incentive spirometers; one she blows into and the other she sucks on. Encouraged patient to continue to perform deep breathing exercises to help prevent post operative pneumonia. Instructed patient to take deep breath, inhaling thru nose until unable to take in any more air and exhaling air slowly thru mouth. Before next breath patient instructed to place mouth incentive spirometer creating a tight seal and suck air in slowly until unable to inhale any more air. Patient is then instructed to remove mouth from incentive spirometer allowing ball to fall. Patient instructed to then take several normal breaths and then repeat previous steps. Advised  to perform breathing exercise at least 5 times every hour. Educated patient on the importance of daily weights. Patient was able to verbalize she is to weigh daily and report more than a 2 lbs weight gain X 2 days. Educated patient on the importance of daily weights.  Instructed patient to weigh self at the same time of day; preferably in the morning after urinating but before eating, wearing the same amount of clothing. Patient instructed to write down the date, time and weight each time he/she weighs. Instructed to report a weight gain of 3 lbs 5 lbs or more in one week. Also instructed patient to monitor for SOB while lying flat, swelling to legs/feet, fatigue with doing normal daily activities or swelling/fullness to abdomen. Reconciled home medications and reviewed allergies. Patient reports  gave a full tab this morning; Metoprolol 25 mg tab. Advised patient she is to 1/2 tab (12.5 mg) of Metoprolol twice daily. Advised patient since she took one tab today to start taking 1/2 tab BID tomorrow. Patient voices understanding Reinforced that if Bumex is discontinued potassium should be stopped as well. Patient aware. Patient did nto fill prescription for Senna. .Educated patient to monitor and report the following Red flags: Redness, warmth at sites, swelling, bleeding or pus-like drainage, chills, fever (> 100.5), nausea/vomiting, SOB, chest pain/pressure, increased swelling to legs/feet, increased SOB, abdominal fullness, radiating pain in jaw, arms or between shoulder blades, diaphoresis  or any new or concerning symptoms. Patient voices understanding of information discussed and is aware of  when to seek medical attention from cardiology, PCP or ED. Reviewed upcoming appointments. Patient verbalizes she has an appointment with the cardiac clinic tomorrow. Will try to make it. Opportunity to ask questions was provided. Has contact information for future reference or further questions. Potential Barriers to care:  Patient's challenges to self management identified: No apparent barriers at this time. Patient's motivational level on a scale of 0-10: 8  Medication Management:  good adherence and good understanding    Goals/Plan of Care:  Goals        Heart Failure     Maintains daily weight.             Plan: Patient will monitor/record/report daily weights as instructed. Post Hospitalization     Knowledge and adherence of prescribed medication (ie. action, side effects, missed dose, etc.). Plan: Will adhere to current medication regimen and report s/e of medications.  Prevent complications post hospitalization. Plan: Patient will monitor/report s/s infection, exacerbation of CHF or worsening of condition.  Understands red flags post discharge. Plan: Will monitor for red flags as specified/discussed. Appointments:  Cardiac clinic 11/29/17  Dr. Nicolás Whatley, cardiology 12/11/17 at 11:20 AM  Dr. Mas 12/13/17 at 10 AM   Declined earlier appt  Patient aware of appointments.  will provide transportation. This represents Transitions of Care b/c NN spoke with patient and/or caregiver within 2 business days of discharge. Rajeev Tamayo

## 2017-11-30 ENCOUNTER — PATIENT OUTREACH (OUTPATIENT)
Dept: INTERNAL MEDICINE CLINIC | Age: 72
End: 2017-11-30

## 2017-11-30 NOTE — PROGRESS NOTES
Attempted to have patient schedule earlier appointment. Patient verbalizes she has other appointments and she will contact office if she can come in earlier.

## 2017-12-01 DIAGNOSIS — E55.9 HYPOVITAMINOSIS D: ICD-10-CM

## 2017-12-01 DIAGNOSIS — E78.5 HYPERLIPIDEMIA LDL GOAL <160: ICD-10-CM

## 2017-12-07 ENCOUNTER — PATIENT OUTREACH (OUTPATIENT)
Dept: INTERNAL MEDICINE CLINIC | Age: 72
End: 2017-12-07

## 2017-12-07 NOTE — PROGRESS NOTES
Contacted patient for transition of care follow up. Patient reports:  Pain between shoulder blades(right sided) X several days  Using O2 only @[facility]  Small lump at top on chest incision  Occasional burning in chest  Weight 205 lbs  Bilateral leg swelling; L worse than R  Chest and left leg incisions well healed    Patient denies:  N/V  Drainage  Redness  SOB  CP/pressure  Dysuria  Bowel issues    Patient reports [facility] nurse to check PT/INR tomorrow. Currently taking Coumadin 6 mg (three 2 mg tabs) MWF; TuThSatSun 4 mg (two 2 mg tabs). Reports bilateral leg swelling; left worse than right. Reports left leg feel tighter and warmer. Denies redness. Instructed to monitor over the weekend and to call if swelling worsens or any new s/s. Reports weighing daily. 12/3: 202.4 lbs  12/4: 202.4 lbs  12/5: 204.6 lbs  12/6: 203.6 lbs  12/7: 205 lbs    Advised patient to continue to monitor and report weight gain of 3 or more lbs overnight. Patient voices understanding. Reconciled medications. Patient states not taking Thergram multivitamin b/c it contains Vit K. Patient reports [facility] is sending equipment to home for an overnight sleep study. Only using xygen at bedtime. Denies SOB/HERNANDEZ at any other itmes during the say. Verbalizes  instructions will be provided. Patient voices she plans to do this over the weekend. Also scheduled for 6 minute walk test on 12/12. Was seen by cardiology on Monday and has plan to participate in cardiac rehab. Patient request refill on Ativan 0.5 mg tabs. Takes for sleep. Verbalizes hard to fall asleep b/c she is constantly thinking about all the things that have happened recently. Patient sounded tearful. Will pend Ativan refill. Reviewed upcoming appt with PCP. Encouraged patient to contact office if leg swelling worsens, weight gain 3 or greater overnight or any other new/concerning s/s. Informed patient we have on call providers that will respond to her call. Patient voices understanding. Goals Addressed             Most Recent       Heart Failure     Maintains daily weight. On track (12/7/2017)             Plan: Patient will monitor/record/report daily weights as instructed. 12/7/17: Progressing towards goal: Yes: Comment: Patient reports monitoring weight daily. Weights were provided (see note). Post Hospitalization     Knowledge and adherence of prescribed medication (ie. action, side effects, missed dose, etc.). On track (12/7/2017)             Plan: Will adhere to current medication regimen and report s/e of medications. 12/7/17: Progressing towards goal: Yes: Comment: Reconciled medications.  Prevent complications post hospitalization. On track (12/7/2017)             Plan: Patient will monitor/report s/s infection, exacerbation of CHF or worsening of condition. 12/7/17: Progressing towards goal: Yes: Comment: Patient continues to monitor for red flags as previously discussed.  Understands red flags post discharge. On track (12/7/2017)             Plan: Will monitor for red flags as specified/discussed. 12/7/17: Progressing towards goal: Yes: Comment: Patient closely monitoring weights and leg swelling (left).

## 2017-12-08 ENCOUNTER — TELEPHONE (OUTPATIENT)
Dept: INTERNAL MEDICINE CLINIC | Age: 72
End: 2017-12-08

## 2017-12-08 RX ORDER — LEVOTHYROXINE SODIUM 112 UG/1
TABLET ORAL
Qty: 90 TAB | Refills: 0 | Status: SHIPPED | OUTPATIENT
Start: 2017-12-08

## 2017-12-08 RX ORDER — LORAZEPAM 0.5 MG/1
0.5 TABLET ORAL
OUTPATIENT
Start: 2017-12-08

## 2017-12-08 RX ORDER — LORAZEPAM 0.5 MG/1
TABLET ORAL
Qty: 90 TAB | Refills: 0 | Status: SHIPPED | OUTPATIENT
Start: 2017-12-08

## 2017-12-13 ENCOUNTER — PATIENT OUTREACH (OUTPATIENT)
Dept: INTERNAL MEDICINE CLINIC | Age: 72
End: 2017-12-13

## 2017-12-13 NOTE — PROGRESS NOTES
Patient cancelled PCP appointment for today. Rescheduled for 12/15/17. Contacted patient for transition of care follow up. Patient reports:  Chest wound looks good; d/c/i  Chest hurts with coughing,laughing or sneezing  Wt today 203.8 lbs; weight have only varied by ounces  Using O2 at night only; did not use last night   Leg swelling; unchanged, no worsening      Patient denies:  SOB  HERNANDEZ  CP  Dizziness  Redness, drainage, swelling (chest wound)    Patient verbalizes there was a schedule conflict. She was seen by cardiology on Monday at which time they scheduled her to come in today so she cancelled appt with PCP. When she arrived she was told they scheduled appointment in error. DIMPLE has been rescheduled for 12/15/17 with PCP. Patient aware. Patient reports seen by Dr. Shalom Gonzales, cardiology on Monday. BP was 90/60 and was lightheaded. Labs were ordered. Denies SOB, lightheadedness or dizziness since that time. Is using oxygen only at night but admits to not using last night. Patient reports cardiology would like her to keep oxygen as a precaution for now. Patient reports chest wound looks good; healing well. Goals        Heart Failure     Maintains daily weight. Plan: Patient will monitor/record/report daily weights as instructed. 12/7/17: Progressing towards goal: Yes: Comment: Patient reports monitoring weight daily. Weights were provided (see note). 12/13/17: Progressing towards goal: Yes: Comment: Patient reports weighing daily. Weight today was 203.8 lbs. Verbalizes weight has not varied by more than a couple of ounces. Post Hospitalization     Attends follow-up appointments as directed. Plan: Notify patient of upcoming appointments with specialists and PCP.  12/13/17: Progressing towards goal: No Patient cancelled transition of care appt. Rescheduled for 12/15/17.           Knowledge and adherence of prescribed medication (ie. action, side effects, missed dose, etc.). Plan: Will adhere to current medication regimen and report s/e of medications. 12/7/17: Progressing towards goal: Yes: Comment: Reconciled medications.  Prevent complications post hospitalization. Plan: Patient will monitor/report s/s infection, exacerbation of CHF or worsening of condition. 12/7/17: Progressing towards goal: Yes: Comment: Patient continues to monitor for red flags as previously discussed. 12/13/17: Progressing towards goal: Yes: Comment: Patient verbalizes awareness of red flags to report.  Understands red flags post discharge. Plan: Will monitor for red flags as specified/discussed. 12/7/17: Progressing towards goal: Yes: Comment: Patient closely monitoring weights and leg swelling (left).

## 2017-12-15 ENCOUNTER — OFFICE VISIT (OUTPATIENT)
Dept: INTERNAL MEDICINE CLINIC | Age: 72
End: 2017-12-15

## 2017-12-15 VITALS
HEART RATE: 89 BPM | SYSTOLIC BLOOD PRESSURE: 110 MMHG | DIASTOLIC BLOOD PRESSURE: 76 MMHG | RESPIRATION RATE: 12 BRPM | BODY MASS INDEX: 34.09 KG/M2 | WEIGHT: 204.6 LBS | OXYGEN SATURATION: 98 % | TEMPERATURE: 97.7 F | HEIGHT: 65 IN

## 2017-12-15 DIAGNOSIS — E78.5 HYPERLIPIDEMIA LDL GOAL <100: ICD-10-CM

## 2017-12-15 DIAGNOSIS — I82.402 ACUTE DEEP VEIN THROMBOSIS (DVT) OF LEFT LOWER EXTREMITY, UNSPECIFIED VEIN (HCC): ICD-10-CM

## 2017-12-15 DIAGNOSIS — I25.10 ASHD (ARTERIOSCLEROTIC HEART DISEASE): Primary | ICD-10-CM

## 2017-12-15 DIAGNOSIS — I10 ESSENTIAL HYPERTENSION: ICD-10-CM

## 2017-12-15 RX ORDER — POTASSIUM CHLORIDE 1500 MG/1
TABLET, FILM COATED, EXTENDED RELEASE ORAL
Qty: 90 TAB | Refills: 0 | Status: SHIPPED | OUTPATIENT
Start: 2017-12-15

## 2017-12-15 RX ORDER — METOPROLOL TARTRATE 25 MG/1
12.5 TABLET, FILM COATED ORAL 2 TIMES DAILY
Qty: 90 TAB | Refills: 1 | Status: SHIPPED | OUTPATIENT
Start: 2017-12-15

## 2017-12-15 NOTE — MR AVS SNAPSHOT
Visit Information Date & Time Provider Department Dept. Phone Encounter #  
 12/15/2017 12:00 PM Brenda Cisneros MD [facility] [phone] Your Appointments 3/20/2018 11:00 AM  
Office Visit with Brenda Cisneros MD  
[facility] [address]) Appt Note: 3 month follow up labs at appt if needed per rm  
 [address] [facility], [address] [id number] [address] [place]  
  
   
 [address], [address] Upcoming Health Maintenance Date Due  
 MEDICARE YEARLY EXAM 10/4/2012 GLAUCOMA SCREENING Q2Y 4/20/2017 COLONOSCOPY 1/20/2026 DTaP/Tdap/Td series (2 - Td) 6/8/2027 Allergies as of 12/15/2017  Review Complete On: 12/15/2017 By: Brenda Cisneros MD  
  
 Severity Noted Reaction Type Reactions Other Medication High 12/07/2017   Systemic Other (comments) Patient reports oral liquid potassium caused diaphoresis and SOB. Codeine  01/27/2012    Other (comments) Drops her blood pressure Iodine  02/28/2012    Hives Percodan [Oxycodone Hcl-oxycodone-asa]  01/27/2012    Other (comments) Feels strange Sulfa (Sulfonamide Antibiotics)  01/27/2012    Rash Vicodin [Hydrocodone-acetaminophen]  01/27/2012    Other (comments) Feels strange Current Immunizations  Reviewed on 6/8/2017 Name Date Influenza High Dose Vaccine PF 2/3/2017 12:03 PM  
 Influenza Vaccine (Quad) PF 1/20/2016  2:39 PM  
 Influenza Vaccine PF 10/21/2014, 10/28/2013 Pneumococcal Conjugate (PCV-13) 6/8/2017 10:11 AM  
 Pneumococcal Polysaccharide (PPSV-23) 8/28/2013 10:17 AM  
 Tdap 6/8/2017 10:12 AM  
 Zoster Vaccine, Live 10/28/2013 Not reviewed this visit You Were Diagnosed With   
  
 Codes Comments ASHD (arteriosclerotic heart disease)    -  Primary ICD-10-CM: I25.10 ICD-9-CM: 414.00  Acute deep vein thrombosis (DVT) of left lower extremity, unspecified vein (HCC)     ICD-10-CM: A73.137 ICD-9-CM: 453.40 Essential hypertension     ICD-10-CM: I10 
ICD-9-CM: 401.9 Hyperlipidemia LDL goal <100     ICD-10-CM: E78.5 ICD-9-CM: 272.4 Vitals BP Pulse Temp Resp Height(growth percentile) Weight(growth percentile) 110/76 89 97.7 °F (36.5 °C) (Oral) 12 5' 5\" (1.651 m) 204 lb 9.6 oz (92.8 kg) SpO2 BMI OB Status Smoking Status 98% 34.05 kg/m2 Postmenopausal Never Smoker Vitals History BMI and BSA Data Body Mass Index Body Surface Area 34.05 kg/m 2 2.06 m 2 Preferred Pharmacy Pharmacy Name Phone [address] [facility], [address] [place] [address]  [phone] Your Updated Medication List  
  
   
This list is accurate as of: 12/15/17 12:58 PM.  Always use your most recent med list.  
  
  
  
  
 acetaminophen 500 mg tablet Commonly known as:  TYLENOL  
1,000 mg.  
  
 aspirin delayed-release 81 mg tablet Take 81 mg by mouth nightly. atorvastatin 40 mg tablet Commonly known as:  LIPITOR 40 mg.  
  
 bumetanide 0.5 mg tablet Commonly known as:  BUMEX  
0.5 mg.  
  
 cholecalciferol (vitamin D3) 2,000 unit Tab Commonly known as:  VITAMIN D3  
1 tablet by mouth daily * levothyroxine 112 mcg tablet Commonly known as:  SYNTHROID  
TAKE 1 TABLET BY MOUTH EVERY DAY BEFORE BREAKFAST * levothyroxine 112 mcg tablet Commonly known as:  SYNTHROID  
TAKE 1 TABLET BY MOUTH EVERY DAY BEFORE BREAKFAST LORazepam 0.5 mg tablet Commonly known as:  ATIVAN  
1 tablet at bedtime as needed for sleep  
  
 metoprolol tartrate 25 mg tablet Commonly known as:  LOPRESSOR  
12.5 mg.  
  
 potassium chloride 20 mEq tablet Commonly known as:  K-DUR, KLOR-CON 40 mEq.  
  
 senna-docusate 8.6-50 mg per tablet Commonly known as:  Vertie Lacie Take 2 Tabs by Mouth 2 Times Daily As Needed (constipation). warfarin 2 mg tablet Commonly known as:  COUMADIN  
4 mg. * Notice: This list has 2 medication(s) that are the same as other medications prescribed for you. Read the directions carefully, and ask your doctor or other care provider to review them with you. To-Do List   
 12/15/2017 Lab:  PROTHROMBIN TIME + INR Introducing [facility] SERVICES! Dear Yuridia Look: 
Thank you for requesting a [employer] account. Our records indicate that you already have an active [employer] account. You can access your account anytime at [email or web address] Did you know that you can access your hospital and ER discharge instructions at any time in [employer]? You can also review all of your test results from your hospital stay or ER visit. Additional Information If you have questions, please visit the Frequently Asked Questions section of the [employer] website at [email or web address] Remember, [employer] is NOT to be used for urgent needs. For medical emergencies, dial 911. Now available from your iPhone and Android! Please provide this summary of care documentation to your next provider. Your primary care clinician is listed as Roderick Moya. Jocelyne Randhawa. If you have any questions after today's visit, please call [phone].

## 2017-12-15 NOTE — PROGRESS NOTES
Kasi Daniel 1945, is a 81 y.o. female, who is seen today for reevaluation after recent coronary artery bypass surgery. In the past the patient has had some occasional slight dyspnea but really no other symptoms. Several weeks ago she developed a trembling in her left hand and very soon thereafter rather severe pain in her anterior chest like a knife was boring down into her chest is a deep pain associated with sweating and dyspnea. She was taken to the hospital and catheterization showed left main disease that she was transferred to [place] and had bypass surgery. After surgery she developed DVT twice in the left lower extremity. She is now on warfarin and has been getting protimes done by the visiting nurse but that will end next week. Protimes have been done weekly recently. She is on lots of new meds. She has been taking potassium 20 mEq 2 tablets twice daily and hates these pills. She is on a low dose of Bumex for edema. She also notes that ever since she was in the hospital she has had pain at the base of the right scapula particularly if she reaches for something. It is better than it was but still hurts her. No pain up in the shoulder or elsewhere.     Past Medical History:   Diagnosis Date    Allergic rhinitis     Esophageal stricture     GERD (gastroesophageal reflux disease)     Hypercholesterolemia     Squamous cell carcinoma     of the skin    Thromboembolus (HCC) 80's    L arm and bilateral LE    Thyroid disease     Hypothyroid     Past Surgical History:   Procedure Laterality Date    CARDIAC SURG PROCEDURE UNLIST  10/2017    Left main bypass surgery    HX HEENT      tonsillectomy    XR ENDO EGD W DILATATION  2/2/12    intrinsic stricture, esophageal junction     Current Outpatient Prescriptions   Medication Sig Dispense Refill    levothyroxine (SYNTHROID) 112 mcg tablet TAKE 1 TABLET BY MOUTH EVERY DAY BEFORE BREAKFAST 90 Tab 0    LORazepam (ATIVAN) 0.5 mg tablet 1 tablet at bedtime as needed for sleep 90 Tab 0    acetaminophen (TYLENOL) 500 mg tablet 1,000 mg.  bumetanide (BUMEX) 0.5 mg tablet 0.5 mg.      metoprolol tartrate (LOPRESSOR) 25 mg tablet 12.5 mg.  potassium chloride (K-DUR, KLOR-CON) 20 mEq tablet 40 mEq.  warfarin (COUMADIN) 2 mg tablet 4 mg.  atorvastatin (LIPITOR) 40 mg tablet 40 mg.      aspirin delayed-release 81 mg tablet Take 81 mg by mouth nightly.  cholecalciferol, vitamin D3, (VITAMIN D3) 2,000 unit tab 1 tablet by mouth daily 100 Tab prn    levothyroxine (SYNTHROID) 112 mcg tablet TAKE 1 TABLET BY MOUTH EVERY DAY BEFORE BREAKFAST 90 Tab 0    senna-docusate (PERICOLACE) 8.6-50 mg per tablet Take 2 Tabs by Mouth 2 Times Daily As Needed (constipation). Visit Vitals    /76    Pulse 89    Temp 97.7 °F (36.5 °C) (Oral)    Resp 12    Ht 5' 5\" (1.651 m)    Wt 204 lb 9.6 oz (92.8 kg)    SpO2 98%    BMI 34.05 kg/m2     Carotids are 2+ without bruits. Lungs are clear to percussion. Good breath sounds with no wheezing or crackles. Heart reveals a regular rhythm with normal S1 and S2 no murmur gallop click or rub. Extremities reveal no clubbing cyanosis or edema. Pulses are 2+. With range of motion of the right shoulder there is no pain but when she reaches there is pain in the scapula. Assessment: #1. Doing well after coronary artery bypass surgery for left main disease. She will continue aspirin and low-dose metoprolol. #2. Hyperlipidemia now needs to be better controlled since she does have coronary disease. She will continue atorvastatin 40 mg each evening and we will check lipids the next time she is here. #3. Hypertension well controlled. #4.  Obesity down only 6 pounds over the last 6 months. She will keep working on weight loss. #5.  DVT, after the nurse does her last pro time next week we will do protimes in the future.   I gave her a lab slip to have this done near where she lives since she lives about 15 minutes away from here and we will manage protimes through this office. She will need to be on warfarin for another 4-5 months. Follow-up in 3 months and we will check CMP and lipids when she is here    Calli Bates. Isabelle Dwyer MD FACP    Please note: This document has been produced using voice recognition software. Unrecognized errors in transcription may be present.

## 2017-12-19 RX ORDER — WARFARIN 2 MG/1
4 TABLET ORAL DAILY
Qty: 60 TAB | Refills: 0 | Status: SHIPPED | OUTPATIENT
Start: 2017-12-19 | End: 2017-12-20 | Stop reason: SDUPTHER

## 2017-12-19 NOTE — TELEPHONE ENCOUNTER
Please verify dosage. Last Visit: 12/15/2017 with MD Marcia Dwyer    Next Appointment: 03/20/2018 with MD Marcia Dwyer     Requested Prescriptions     Pending Prescriptions Disp Refills    warfarin (COUMADIN) 2 mg tablet 60 Tab 0     Sig: Take 2 Tabs by mouth daily.

## 2017-12-20 RX ORDER — WARFARIN 2 MG/1
TABLET ORAL
Qty: 180 TAB | Refills: 0 | Status: SHIPPED | OUTPATIENT
Start: 2017-12-20

## 2017-12-22 ENCOUNTER — PATIENT OUTREACH (OUTPATIENT)
Dept: INTERNAL MEDICINE CLINIC | Age: 72
End: 2017-12-22

## 2017-12-22 NOTE — PROGRESS NOTES
Contacted patient for transition of care follow up. No answer. Call answered by Heri Dewitt, . Introduced self/role and reason for call.  verbalizes patient is on another call. He reports she is doing fairly well.  verbalizes he will have patient return call.

## 2017-12-22 NOTE — PROGRESS NOTES
Contacted patient for transition of care follow up. Patient reports:  Chest wound healing well  Fatigue; \"get tired so quickly\"  Leg swelling improving; \"feels tight\"  HERNANDEZ   Pain under right breast and right scapula    Patient denies:  Warmth   Redness  Pain   N/V  Fever/chills  CP/pressure    Patient reports oxygen level drops when she is just laying down watching tv. Instructed patient to use oxygen while lying down. Patient reports she has checked oxygen levels when up. Denies SOB or HERNANDEZ when up moving around. Patient reports she is using 2-2.5 LPM but was ordered to have 3 LPM. Advised patient to use 3 LPM as instructed. Reports she is having intermittent \"little pinches\" all over her body. Verbalizes it is not painful. Patient reports she is still taking Coumadin 4 mg daily. Next PT/INR 12/26/17. Encouraged patient to contact office with questions or concerning s/s.

## 2017-12-28 ENCOUNTER — PATIENT OUTREACH (OUTPATIENT)
Dept: INTERNAL MEDICINE CLINIC | Age: 72
End: 2017-12-28

## 2017-12-28 NOTE — PROGRESS NOTES
Contacted patient for transition of care follow up. Patient reports:  Chest wound d/c/i; tender to touch  Pain increases with certain movements  Pain with sneezing  Abdomen a little \"puffier\"  Bilateral leg swelling; left > right  Medial left leg; tender to touch  Left leg incision d/c/i  O2 at HS and when laying flat; 3 LPM via NC  Sleeping on 2 pillows at night  HERNANDEZ better  No change to Coumadin; 4 mg daily    Patient denies:  Redness, warmth, drainage to chest or left leg wounds  N/V  Fever/chills    Daily weights:  12/28: 202 lbs  12/27: 201.8 lbs  12/26: 201.6 lbs  12/25: 200 lbs    Re-educated patient on the importance of daily weights. Instructed patient to monitor/report a weight gain of 3 lbs or greater in 24 hours/1 day or 5 lbs or more in one week. Also instructed patient to monitor for SOB while lying flat, increased swelling to legs/feet, fatigue with doing normal daily activities or swelling/fullness to abdomen. Patient voices understanding and will continue to monitor. Patient asked who should she report changes to. Advised patient she may contact our office or her cardiologist (Dr. Marcella Arias). Patient verbalizes she had her INR checked and she remains on Coumadin 4 mg daily. Follow up with Dr. Marcella Arias scheduled for 1/16/18. Patient reports she is using oxygen at bedtime and when lying flat. Patient on 3 LPM via nasal cannula. Patient verbalizes she has contacted [facility] to  smaller tanks but they have not done so. Patient also communicates she requested new NC and was told she will need an order from pulmonologist. Advised patient to reach to Dr. Sarthak Ram (pulmonology) office and make them aware. Patient also voices concern of limitations such as taking a trip overnight due to oxygen needs. Advised patient to discuss with pulmonology to establish a plan to facilitate quality of life. Patient was receptive. Patient and writer agreed to follow up in about a week.   Patient reminded that there are physicians on call 24 hours a day / 7 days a week (M-F 5pm to 8am and from Friday 5pm until Monday 8a for the weekend) should the patient have questions or concerns. Goals Addressed             Most Recent       Heart Failure     Maintains daily weight. On track (12/28/2017)             Plan: Patient will monitor/record/report daily weights as instructed. 12/7/17: Progressing towards goal: Yes: Comment: Patient reports monitoring weight daily. Weights were provided (see note). 12/13/17: Progressing towards goal: Yes: Comment: Patient reports weighing daily. Weight today was 203.8 lbs. Verbalizes weight has not varied by more than a couple of ounces. Post Hospitalization     Prevent complications post hospitalization. On track (12/28/2017)             Plan: Patient will monitor/report s/s infection, exacerbation of CHF or worsening of condition. Weekly follow up by NN.  12/7/17: Progressing towards goal: Yes: Comment: Patient continues to monitor for red flags as previously discussed. 12/13/17: Progressing towards goal: Yes: Comment: Patient verbalizes awareness of red flags to report. 12/28/17: Progressing towards goal: Yes: Comment: Re-educated patient fluid retention/weight gain.  Understands red flags post discharge. On track (12/28/2017)             Plan: Will monitor for red flags as specified/discussed. 12/7/17: Progressing towards goal: Yes: Comment: Patient closely monitoring weights and leg swelling (left).

## 2018-01-03 ENCOUNTER — PATIENT OUTREACH (OUTPATIENT)
Dept: INTERNAL MEDICINE CLINIC | Age: 73
End: 2018-01-03

## 2018-01-03 NOTE — PROGRESS NOTES
Contacted patient for CCM follow up. Patient reports:  Chest discomfort  Swelling between knee and calf; L>R  O2 at HS and when lying flat  HERNANDEZ with climbing stairs    Patient denies:  CP/pressure  Increased SOB  Increases HERNANDEZ  Swelling feet/ankles/abd  Weight gain > 3 lbs    Weights:  1/3: 201 lbs  1/2: 201 lbs  1/1: 201 lbs  12/31: 201.4  12/30: 202 lbs  12/29: 202 lbs  12/28: 202 lbs  12/27: 202 lbs  12/26: 201.6 lbs    Patient reports she contacted Dr. Mayi Blevins office regarding order for new nasal cannula tubing but has not received a return call. Writer will follow up. Overall patient reports doing well. Answered all questions/concerns. Reviewed red flags to monitor and report: increased swelling, increased HERNANDEZ, increased SOB, weight gain > 3 lbs in 24 hours or > 5 lbs or more in a week, chest pain/pressure or any other new concerning s/s. Patient voices understanding. Patient reminded that there are physicians on call 24 hours a day / 7 days a week (M-F 5pm to 8am and from Friday 5pm until Monday 8a for the weekend) should the patient have questions or concerns. Goal/Plan:  Goals Addressed             Most Recent       Heart Failure     Maintains daily weight. On track (1/3/2018)             Plan: Patient will monitor/record/report daily weights as instructed. 12/7/17: Progressing towards goal: Yes: Comment: Patient reports monitoring weight daily. Weights were provided (see note). 12/13/17: Progressing towards goal: Yes: Comment: Patient reports weighing daily. Weight today was 203.8 lbs. Verbalizes weight has not varied by more than a couple of ounces. Post Hospitalization     Prevent complications post hospitalization. On track (1/3/2018)             Plan: Patient will monitor/report s/s infection, exacerbation of CHF or worsening of condition.  Weekly follow up by NN.  12/7/17: Progressing towards goal: Yes: Comment: Patient continues to monitor for red flags as previously discussed. 12/13/17: Progressing towards goal: Yes: Comment: Patient verbalizes awareness of red flags to report. 12/28/17: Progressing towards goal: Yes: Comment: Re-educated patient fluid retention/weight gain.  Understands red flags post discharge. On track (1/3/2018)             Plan: Will monitor for red flags as specified/discussed. 12/7/17: Progressing towards goal: Yes: Comment: Patient closely monitoring weights and leg swelling (left).

## 2018-01-09 ENCOUNTER — PATIENT OUTREACH (OUTPATIENT)
Dept: INTERNAL MEDICINE CLINIC | Age: 73
End: 2018-01-09

## 2018-01-09 NOTE — PROGRESS NOTES
Patient verbalizes she needs a refill on Bumex. States she went to the pharmacy to  prescription but did not have refills. Patient verbalizes she was given an emergency refill; 6 tabs (takes BID). Previous prescription written by Dr. Sundeep Villalta, cardiology. Verified pharmacy. Writer will contact office to request refill. Patient reports she finally received additional nasal cannula tubing. Contacted Dr. Natalia Torres office. Spoke with Mario Buckner. Introduced self/role and reason for call. Provided 2 patient identifiers. Verified Dr. Sundeep Villalta wrote previous Bumex prescription. Confirmed rx was for Bumez 0.5 mg, one tab twice daily. Notified [facility] patient received a 3 day emergency prescription. Requested refill for patient and verified pharmacy information. Maryhelen Simmonds she will submit request.  Also informed Esther Mckeon of patient's concern regarding Bumex being a potassium depleting diuretic. Informed [facility] patient is taking potassium 20 mEq daily. [facility] verbalizes their records indicate the same dosage of potassium. Notified patient. Patient reports:  Right sided chest discomfort  HERNANDEZ with climbing stairs; unchanged    Patient denies:  Worsening SOB/HERNANDEZ  Increased swelling to legs  Abdominal swelling/bloating    Daily weights:  1/9: 201.2 lbs  1/8: 201.6 lbs  1/7: 202.2 lbs  1/6: 201.6 lbs  1/5: 201.4 lbs  1/4: 201.2 lbs    Patient reports right sided chest discomfort. Verbalizes pain is located at top of right breast and has pain when when she uses right arm to do something. Denies CP, diaphoresis, N/V. Patient verbalizes she is doing ok. Reports INR was 1.9 and now taking Coumadin 4 mg daily; next INR 1/16. Reconciled medications. Provided opportunity to ask questions. All questions answered. Encouraged to contact office with further needs/questions/concerns.

## 2018-01-17 ENCOUNTER — PATIENT OUTREACH (OUTPATIENT)
Dept: INTERNAL MEDICINE CLINIC | Age: 73
End: 2018-01-17

## 2018-01-17 NOTE — PROGRESS NOTES
Contacted patient for [facility] follow up. Patient reports:  [facility] services stopped 2 weeks ago  Received Bumex  Keloid at bottom of chest wound  HERNANDEZ with moderate activity; no change  Weights ave ranged between 201.2 to 201.4 lbs  Leg swelling with warmth, L>R; no worse    Patient denies:  Wound d/c/i  Drainage  Redness  Swelling  Warmth  SOB  CP  Fever  Chills  Nausea  Vomiting  Dizziness  Diarrhea  Constipation  Dysuria    Patient verbalizes she was seen by Dr. Godwin Bruce, cardiology yesterday. Reports wound looks good. [facility]  cardiology agreed that he will refill all scripts related cardiology. Patient communicates cardiology wants patient to switch pulmonologist from Dr. Cole Rick to Dr. Jacque Bundy. Reports INR was 1.9. Taking Coumadin 2 mg tabs; Tu, Th, Sat 2.5 tabs and 2 tabs on MWF and Sun. Next INR 1/24. Patient verbalizes to attend cardiac rehab. PVL study on 1/24/18. Goals Addressed             Most Recent       Heart Failure     Maintains daily weight. On track (1/17/2018)             Plan: Patient will monitor/record/report daily weights as instructed. 12/7/17: Progressing towards goal: Yes: Comment: Patient reports monitoring weight daily. Weights were provided (see note). 12/13/17: Progressing towards goal: Yes: Comment: Patient reports weighing daily. Weight today was 203.8 lbs. Verbalizes weight has not varied by more than a couple of ounces. Post Hospitalization     Attends follow-up appointments as directed. On track (1/17/2018)             Plan: Notify patient of upcoming appointments with specialists and PCP.  12/13/17: Progressing towards goal: No Patient cancelled transition of care appt. Rescheduled for 12/15/17.   12/15/17: Progressing towards goal: Yes: Comment: Patient attended follow up appointment with PCP.   1/17/18: Progressing towards goal: Yes: Comment: Attended cardiology appointment on 1/16/18.  Knowledge and adherence of prescribed medication (ie. action, side effects, missed dose, etc.). On track (1/17/2018)             Plan: Will adhere to current medication regimen and report s/e of medications. 12/7/17: Progressing towards goal: Yes: Comment: Reconciled medications.  Prevent complications post hospitalization. On track (1/17/2018)             Plan: Patient will monitor/report s/s infection, exacerbation of CHF or worsening of condition. Weekly follow up by NN.  12/7/17: Progressing towards goal: Yes: Comment: Patient continues to monitor for red flags as previously discussed. 12/13/17: Progressing towards goal: Yes: Comment: Patient verbalizes awareness of red flags to report. 12/28/17: Progressing towards goal: Yes: Comment: Re-educated patient fluid retention/weight gain.  Understands red flags post discharge. On track (1/17/2018)             Plan: Will monitor for red flags as specified/discussed. 12/7/17: Progressing towards goal: Yes: Comment: Patient closely monitoring weights and leg swelling (left).

## 2018-01-30 ENCOUNTER — PATIENT OUTREACH (OUTPATIENT)
Dept: INTERNAL MEDICINE CLINIC | Age: 73
End: 2018-01-30

## 2018-03-06 RX ORDER — LEVOTHYROXINE SODIUM 112 UG/1
TABLET ORAL
Qty: 90 TAB | Refills: 0 | Status: SHIPPED | OUTPATIENT
Start: 2018-03-06